# Patient Record
Sex: FEMALE | Race: WHITE | NOT HISPANIC OR LATINO | Employment: STUDENT | URBAN - METROPOLITAN AREA
[De-identification: names, ages, dates, MRNs, and addresses within clinical notes are randomized per-mention and may not be internally consistent; named-entity substitution may affect disease eponyms.]

---

## 2024-04-24 ENCOUNTER — HOSPITAL ENCOUNTER (EMERGENCY)
Facility: HOSPITAL | Age: 19
Discharge: HOME | End: 2024-04-25
Attending: EMERGENCY MEDICINE
Payer: COMMERCIAL

## 2024-04-24 DIAGNOSIS — S81.851A DOG BITE OF RIGHT LOWER LEG: Primary | ICD-10-CM

## 2024-04-24 DIAGNOSIS — R55 VASOVAGAL SYNCOPE: ICD-10-CM
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
## 2024-04-24 PROCEDURE — 12032 INTMD RPR S/A/T/EXT 2.6-7.5: CPT

## 2024-04-24 PROCEDURE — 81025 URINE PREGNANCY TEST: CPT | Performed by: EMERGENCY MEDICINE

## 2024-04-24 PROCEDURE — 99284 EMERGENCY DEPT VISIT MOD MDM: CPT | Mod: 25

## 2024-04-24 RX ORDER — ACETAMINOPHEN 500 MG
1000 TABLET ORAL
Status: COMPLETED | OUTPATIENT
Start: 2024-04-24 | End: 2024-04-25

## 2024-04-24 RX ORDER — LIDOCAINE HYDROCHLORIDE 10 MG/ML
10 INJECTION, SOLUTION EPIDURAL; INFILTRATION; INTRACAUDAL; PERINEURAL
Status: COMPLETED | OUTPATIENT
Start: 2024-04-24 | End: 2024-04-25

## 2024-04-25 VITALS
TEMPERATURE: 98 F | HEART RATE: 86 BPM | RESPIRATION RATE: 20 BRPM | OXYGEN SATURATION: 99 % | SYSTOLIC BLOOD PRESSURE: 106 MMHG | WEIGHT: 125 LBS | DIASTOLIC BLOOD PRESSURE: 58 MMHG

## 2024-04-25 LAB
B-HCG UR QL: NEGATIVE
CTP QC/QA: YES

## 2024-04-25 PROCEDURE — 63600175 PHARM REV CODE 636 W HCPCS: Mod: JZ,JG | Performed by: EMERGENCY MEDICINE

## 2024-04-25 PROCEDURE — 25000003 PHARM REV CODE 250: Performed by: EMERGENCY MEDICINE

## 2024-04-25 PROCEDURE — 90471 IMMUNIZATION ADMIN: CPT | Performed by: EMERGENCY MEDICINE

## 2024-04-25 PROCEDURE — 90675 RABIES VACCINE IM: CPT | Mod: JZ,JG | Performed by: EMERGENCY MEDICINE

## 2024-04-25 PROCEDURE — 25000003 PHARM REV CODE 250: Performed by: STUDENT IN AN ORGANIZED HEALTH CARE EDUCATION/TRAINING PROGRAM

## 2024-04-25 PROCEDURE — 90375 RABIES IG IM/SC: CPT | Mod: JZ,JG | Performed by: EMERGENCY MEDICINE

## 2024-04-25 RX ORDER — CEFUROXIME AXETIL 250 MG/1
500 TABLET ORAL
Status: DISCONTINUED | OUTPATIENT
Start: 2024-04-25 | End: 2024-04-25

## 2024-04-25 RX ORDER — CEFUROXIME AXETIL 500 MG/1
500 TABLET ORAL EVERY 12 HOURS
Qty: 20 TABLET | Refills: 0 | Status: SHIPPED | OUTPATIENT
Start: 2024-04-25 | End: 2024-05-05

## 2024-04-25 RX ORDER — CEFADROXIL 500 MG/1
500 CAPSULE ORAL ONCE
Status: COMPLETED | OUTPATIENT
Start: 2024-04-25 | End: 2024-04-25

## 2024-04-25 RX ORDER — METRONIDAZOLE 500 MG/1
500 TABLET ORAL EVERY 8 HOURS
Qty: 30 TABLET | Refills: 0 | Status: SHIPPED | OUTPATIENT
Start: 2024-04-25 | End: 2024-05-05

## 2024-04-25 RX ORDER — METRONIDAZOLE 500 MG/1
500 TABLET ORAL
Status: COMPLETED | OUTPATIENT
Start: 2024-04-25 | End: 2024-04-25

## 2024-04-25 RX ORDER — NAPROXEN 500 MG/1
500 TABLET ORAL 2 TIMES DAILY WITH MEALS
Qty: 30 TABLET | Refills: 0 | Status: SHIPPED | OUTPATIENT
Start: 2024-04-25

## 2024-04-25 RX ADMIN — RABIES VIRUS STRAIN PM-1503-3M ANTIGEN (PROPIOLACTONE INACTIVATED) AND WATER 2.5 UNITS: KIT at 01:04

## 2024-04-25 RX ADMIN — LIDOCAINE HYDROCHLORIDE 100 MG: 10 INJECTION, SOLUTION EPIDURAL; INFILTRATION; INTRACAUDAL at 12:04

## 2024-04-25 RX ADMIN — RABIES IMMUNE GLOBULIN (HUMAN) 1140 UNITS: 300 INJECTION, SOLUTION INFILTRATION; INTRAMUSCULAR at 12:04

## 2024-04-25 RX ADMIN — CEFADROXIL 500 MG: 500 CAPSULE ORAL at 02:04

## 2024-04-25 RX ADMIN — METRONIDAZOLE 500 MG: 500 TABLET ORAL at 02:04

## 2024-04-25 RX ADMIN — ACETAMINOPHEN 1000 MG: 500 TABLET ORAL at 01:04

## 2024-04-25 NOTE — ED PROVIDER NOTES
18-year-old female presents for evaluation of dog bite of the right leg.  Assumed care of this patient pending x-rays of the right lower extremity. Dr Samuel cleaned the wound, repaired it loosely, updated tdap, started rabies vaccine and started antibiotics.  On my exam, patient's pain is well-controlled.  Vitals within normal limits.  She is normal distal pulses in his neurovascularly intact distal to the wound.  Wound was bandaged and I did not undress it.  X-rays here without evidence of underlying fracture or retained foreign body.  Will discharge patient with outpatient follow-up for continued rabies vaccine, return precautions for worsening symptoms.  Of note, patient states that animal is known to her, but does not want to provide information on the animal.  Given patient preference, will not contact animal control at this time.  Patient is stable for discharge.     Zane Pierre MD  04/25/24 [id number]

## 2024-04-25 NOTE — ED TRIAGE NOTES
Norma Pierce, a 18 y.o. female presents to the ED w/ complaint of dog bite to the right log. Unknown status of dogs shots. Redness and swelling noted     Triage note:  Chief Complaint   Patient presents with    Animal Bite     Pt coming from [facility] after being bit by a lab on her lower right leg. Bleeding is controlled at this time. Pt has several puncture wounds to that leg. Pt states owners told pt dog was up to date on vaccines. -blood thinners     Review of patient's allergies indicates:  Not on File  No past medical history on file.    LOC: The patient is awake, alert, aware of environment with an appropriate affect. Oriented x4, speaking appropriately  APPEARANCE: Pt resting comfortably, in no acute distress, pt is clean and well groomed, clothing properly fastened  SKIN:The skin is warm and dry, color consistent with ethnicity, patient has normal skin turgor and moist mucus membranes, no bruising noted Laceration to the right leg   RESPIRATORY:Airway is open and patent, respirations are spontaneous, patient has a normal effort and rate, no accessory muscle use noted.  CARDIAC: Normal rate and rhythm, no peripheral edema noted, capillary refill < 3 seconds, bilateral radial pulses 2+.  ABDOMEN: Soft, non tender, non distended. Bowel sounds present x 4 quadrants.   NEUROLOGIC: PERRLA, facial expression is symmetrical, patient moving all extremities spontaneously, normal sensation in all extremities when touched with a finger.  Follows all commands appropriately  MUSCULOSKELETAL: Patient moving all extremities spontaneously, swelling and redness to site

## 2024-04-25 NOTE — ED PROVIDER NOTES
Encounter Date: 4/24/2024       History     Chief Complaint   Patient presents with    Animal Bite     Pt coming from [facility] after being bit by a lab on her lower right leg. Bleeding is controlled at this time. Pt has several puncture wounds to that leg. Pt states owners told pt dog was up to date on vaccines. -blood thinners     Pt is a 19 yo F with no significant PMH who presents with a dog bite to her right lower leg.  She reports this occurred just prior to arrival.  She was walking outside of [facility] whena service dog which was a labrador bit her on the leg and clamped down. She had to shake to get the dog of of her. She notes that the dog had an owner present and told her that the dog was up to date on vaccines but she would not provide her name or any other information.  She has significant pain to her R lower leg.   Patient tells me she is up to date on immunizations.     The history is provided by the patient.     Review of patient's allergies indicates:   Allergen Reactions    Penicillins Rash     No past medical history on file.  No past surgical history on file.  No family history on file.         Physical Exam     Initial Vitals [04/24/24 2110]   BP Pulse Resp Temp SpO2   118/80 100 20 98 °F (36.7 °C) 100 %      MAP       --         Physical Exam    Nursing note and vitals reviewed.  Constitutional: She appears well-developed and well-nourished. She is not diaphoretic. No distress.   HENT:   Head: Normocephalic and atraumatic.   Eyes: Conjunctivae and EOM are normal.   Neck: Neck supple.   Normal range of motion.  Cardiovascular:  Normal rate, regular rhythm and intact distal pulses.           Pulmonary/Chest: No respiratory distress.   Abdominal: Abdomen is soft. She exhibits no distension.   Musculoskeletal:         General: Normal range of motion.      Cervical back: Normal range of motion and neck supple.     Neurological: She is alert and oriented to person, place, and time. GCS score is 15. GCS  eye subscore is 4. GCS verbal subscore is 5. GCS motor subscore is 6.   Skin: Skin is warm and dry.   See photo below  R lower medial leg with swelling, bruising, tenderness to palpation  Multiple linear lacerations  Largest about 3 cm with adipose tissue exposed, no foreign bodies noted.   There is another 1 cm linear laceration that is gaping and 2 other more superficial lacerations   Psychiatric: She has a normal mood and affect. Her behavior is normal. Judgment and thought content normal.         ED Course   Lac Repair    Date/Time: 4/25/2024 1:08 AM    Performed by: Kerri Samuel MD  Authorized by: Kerri Samuel MD    Consent:     Consent obtained:  Verbal    Risks, benefits, and alternatives were discussed: yes      Risks discussed:  Infection, pain, retained foreign body and poor wound healing    Alternatives discussed:  No treatment  Universal protocol:     Patient identity confirmed:  Verbally with patient  Anesthesia:     Anesthesia method:  Local infiltration    Local anesthetic:  Lidocaine 1% w/o epi  Laceration details:     Location:  Leg    Leg location:  R lower leg    Length (cm):  3 (3 cm and 1 cm)  Pre-procedure details:     Preparation:  Patient was prepped and draped in usual sterile fashion and imaging obtained to evaluate for foreign bodies  Exploration:     Hemostasis achieved with:  Direct pressure    Imaging obtained: x-ray      Wound exploration: wound explored through full range of motion    Treatment:     Amount of cleaning:  Standard    Irrigation solution:  Sterile saline    Irrigation method:  Syringe  Skin repair:     Repair method:  Sutures    Suture size:  4-0    Wound skin closure material used: ethilon.    Number of sutures:  3 (and 1 suture to 1 cm laceration)  Approximation:     Approximation:  Loose  Repair type:     Repair type:  Simple  Post-procedure details:     Dressing:  Antibiotic ointment and non-adherent dressing    Procedure completion:   Tolerated with difficulty  Comments:      Patient had a syncopal event during injection of the lidocaine, blood pressure went to sbp 78 but it recovered and she was able to remain alert and supine for the rest of the procedure.     Labs Reviewed   POCT URINE PREGNANCY          Imaging Results              X-Ray Tibia Fibula 2 View Right (Final result)  Result time 04/25/24 02:12:52      Final result by Fili Longo MD (04/25/24 02:12:52)                   Impression:      Lower extremity soft tissue injury in the region of the medial lower calf.  No acute displaced fracture.      Electronically signed by: Fili Longo MD  Date:    04/25/2024  Time:    02:12               Narrative:    EXAMINATION:  XR TIBIA FIBULA 2 VIEW RIGHT    CLINICAL HISTORY:  Open bite, right lower leg, initial encounter    TECHNIQUE:  AP and lateral views of the right tibia and fibula were performed.    COMPARISON:  None.    FINDINGS:  No acute displaced fracture.  No dislocation.  Soft tissue injury and probable skin defect involving the medial aspect of the lower calf.  Adjacent soft tissue edema.  No unexpected radiopaque foreign body.                                       Medications   rabies vaccine,human diploid injection 2.5 Units (2.5 Units Intramuscular Given 4/25/24 0134)   rabies immune globulin (PF) (HYPERRAB) injection 1,140 Units (1,140 Units Other Given by Provider 4/25/24 0040)   LIDOcaine (PF) 10 mg/ml (1%) injection 100 mg (100 mg Infiltration Given by Provider 4/25/24 0035)   acetaminophen tablet 1,000 mg (1,000 mg Oral Given 4/25/24 0120)   cefadroxil capsule 500 mg (500 mg Oral Given 4/25/24 0208)   metroNIDAZOLE tablet 500 mg (500 mg Oral Given 4/25/24 0208)     Medical Decision Making  Patient with linear lacerations from a dog bite.   During lidocaine injection, patient had a vasovagal syncopal event, has a history of these, vital signs were monitored, patient returned to baseline  She is very likely up to  date on tetanus immunization given her age and she reports being up to date  She wants to pursue rabies vaccine since she has limited knowledge of the dog  RIG Was injected to the wounds and first rabies vaccine given  She was started on antibiotics (alternative to augmentin given pcn allergy)  Educated on appropriate wound care, limited physical activity  Will need to return for 3 more rabies vaccines  Xray pending at change of shift. Care transferred to Dr. Pierre pending Xray.     Amount and/or Complexity of Data Reviewed  Labs: ordered.  Radiology: ordered. Decision-making details documented in ED Course.    Risk  OTC drugs.  Prescription drug management.               ED Course as of 04/26/24 0737   Thu Apr 25, 2024   0218 X-Ray Tibia Fibula 2 View Right  X-ray without evidence of acute fracture or retained foreign body.  Patient is stable for discharge with outpatient follow-up [BS]      ED Course User Index  [BS] Zane Pierre MD                           Clinical Impression:  Final diagnoses:  [S81.851A, W54.0XXA] Dog bite of right lower leg (Primary)  [R55] Vasovagal syncope          ED Disposition Condition    Discharge Stable          ED Prescriptions       Medication Sig Dispense Start Date End Date Auth. Provider    cefUROXime (CEFTIN) 500 MG tablet Take 1 tablet (500 mg total) by mouth every 12 (twelve) hours. for 10 days 20 tablet 4/25/2024 5/5/2024 Kerri Samuel MD    metroNIDAZOLE (FLAGYL) 500 MG tablet Take 1 tablet (500 mg total) by mouth every 8 (eight) hours. for 10 days 30 tablet 4/25/2024 5/5/2024 Kerri Samuel MD    naproxen (NAPROSYN) 500 MG tablet Take 1 tablet (500 mg total) by mouth 2 (two) times daily with meals. 30 tablet 4/25/2024 -- Kerri Samuel MD          Follow-up Information       Follow up With Specialties Details Why Contact Info    [facility] - Emergency Dept Emergency Medicine  for rabies vaccines April 28, May 2, May 9 [id number]  [facility] [id number]  [phone]             Kerri Samuel MD  04/26/24 0733       Kerri Samuel MD  04/26/24 0737

## 2024-04-25 NOTE — DISCHARGE INSTRUCTIONS
You will need 3 more rabies vaccines. Today 4/25/24 is considered Day 0 and you will need vaccines on days 3, 7, 14.     Take Cefuroxime 500 mg twice daily and Flagyl 500 mg three times daily for 10 days to prevent infection.    YOU CAN NOT DRINK ALCOHOL WHILE TAKING FLAGYL BECAUSE IT WILL CAUSE YOU TO VOMIT.    Keep your wound clean and dry.  Wash twice a day and apply Bacitracin 2-3 times a day.  Keep your wound clean, dry, and covered.    Do not exercise or do any strenuous activity for the next 2 weeks to avoid putting tension on your wound.     Elevate your leg to help with pain and swelling. You can also use ice packs. Take Naproxen twice a day to help with pain and swellnig. You can take Tylenol 650 mg every 6 hours in addition to this if needed for pain.    Seek immediate medical attention if you have pus from your wound, fever, increasing redness, severe pain, or for any other concern.

## 2024-04-25 NOTE — ED NOTES
Pt had vagal episode while ED provider was completing lac repair. ED provider remains at bedside. Vitals being taken at this time. Pt lying flat.